# Patient Record
Sex: MALE | Race: WHITE | Employment: PART TIME | URBAN - METROPOLITAN AREA
[De-identification: names, ages, dates, MRNs, and addresses within clinical notes are randomized per-mention and may not be internally consistent; named-entity substitution may affect disease eponyms.]

---

## 2022-05-22 ENCOUNTER — APPOINTMENT (OUTPATIENT)
Dept: GENERAL RADIOLOGY | Age: 67
End: 2022-05-22
Payer: COMMERCIAL

## 2022-05-22 ENCOUNTER — HOSPITAL ENCOUNTER (EMERGENCY)
Age: 67
Discharge: HOME | End: 2022-05-22
Attending: EMERGENCY MEDICINE
Payer: COMMERCIAL

## 2022-05-22 VITALS
HEART RATE: 87 BPM | OXYGEN SATURATION: 93 % | TEMPERATURE: 98.9 F | SYSTOLIC BLOOD PRESSURE: 155 MMHG | DIASTOLIC BLOOD PRESSURE: 97 MMHG | RESPIRATION RATE: 16 BRPM
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
## 2022-05-22 PROCEDURE — 73130 X-RAY EXAM OF HAND: CPT

## 2022-05-22 PROCEDURE — 99283 EMERGENCY DEPT VISIT LOW MDM: CPT

## 2022-05-22 RX ORDER — MELOXICAM 15 MG/1
TABLET ORAL
COMMUNITY

## 2022-05-22 RX ORDER — OLMESARTAN MEDOXOMIL 40 MG/1
40 TABLET ORAL DAILY
COMMUNITY
Start: 2021-12-29

## 2022-05-22 RX ORDER — CELECOXIB 200 MG/1
CAPSULE ORAL
COMMUNITY

## 2022-05-22 ASSESSMENT — PAIN SCALES - GENERAL: PAINLEVEL_OUTOF10: 10

## 2022-05-22 ASSESSMENT — PAIN DESCRIPTION - ORIENTATION: ORIENTATION: LEFT

## 2022-05-22 ASSESSMENT — ENCOUNTER SYMPTOMS
SHORTNESS OF BREATH: 0
SORE THROAT: 0
CONSTIPATION: 0
NAUSEA: 0
VOMITING: 0
DIARRHEA: 0
ABDOMINAL PAIN: 0

## 2022-05-22 ASSESSMENT — PAIN DESCRIPTION - DESCRIPTORS: DESCRIPTORS: ACHING;TIGHTNESS

## 2022-05-22 ASSESSMENT — PAIN DESCRIPTION - LOCATION: LOCATION: WRIST

## 2022-05-22 ASSESSMENT — PAIN - FUNCTIONAL ASSESSMENT: PAIN_FUNCTIONAL_ASSESSMENT: 0-10
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
## 2022-05-22 NOTE — Clinical Note
Mirza Danielson was seen and treated in our emergency department on 5/22/2022. He may return to work on 05/24/2022. If you have any questions or concerns, please don't hesitate to call.       Karey Muller MD

## 2022-05-22 NOTE — ED PROVIDER NOTES
[facility] ED     Emergency Department     Faculty Attestation        I performed a history and physical examination of the patient and discussed management with the resident. I reviewed the residents note and agree with the documented findings and plan of care. Any areas of disagreement are noted on the chart. I was personally present for the key portions of any procedures. I have documented in the chart those procedures where I was not present during the key portions. I have reviewed the emergency nurses triage note. I agree with the chief complaint, past medical history, past surgical history, allergies, medications, social and family history as documented unless otherwise noted below. For Physician Assistant/ Nurse Practitioner cases/documentation I have personally evaluated this patient and have completed at least one if not all key elements of the E/M (history, physical exam, and MDM). Additional findings are as noted. Vital Signs: BP: (!) 155/97  Pulse: 87  Resp: 16  Temp: 98.9 °F (37.2 °C) SpO2: 93 %  PCP:  No primary care provider on file. Pertinent Comments:         Critical Care  None    This patient was evaluated in the Emergency Department for symptoms described in the history of present illness. He/she was evaluated in the context of the global COVID-19 pandemic, which necessitated consideration that the patient might be at risk for infection with the SARS-CoV-2 virus that causes COVID-19. Institutional protocols and algorithms that pertain to the evaluation of patients at risk for COVID-19 are in a state of rapid change based on information released by regulatory bodies including the CDC and federal and state organizations. These policies and algorithms were followed during the patient's care in the ED.     (Please note that portions of this note were completed with a voice recognition program. Efforts were made to edit the dictations but occasionally words are mis-transcribed.  Whenever words are used in this note in any gender, they shall be construed as though they were used in the gender appropriate to the circumstances; and whenever words are used in this note in the singular or plural form, they shall be construed as though they were used in the form appropriate to the circumstances.)    Rahat Kim MD [facility]  Attending Emergency Medicine Physician           Tiana Alvarez MD  05/22/22 5428

## 2023-05-01 ENCOUNTER — APPOINTMENT (OUTPATIENT)
Dept: CT IMAGING | Age: 68
End: 2023-05-01
Payer: MEDICARE

## 2023-05-01 ENCOUNTER — HOSPITAL ENCOUNTER (EMERGENCY)
Age: 68
Discharge: HOME | End: 2023-05-01
Attending: EMERGENCY MEDICINE
Payer: MEDICARE

## 2023-05-01 VITALS
DIASTOLIC BLOOD PRESSURE: 93 MMHG | RESPIRATION RATE: 19 BRPM | BODY MASS INDEX: 32.51 KG/M2 | OXYGEN SATURATION: 92 % | HEART RATE: 94 BPM | HEIGHT: 72 IN | TEMPERATURE: 97.6 F | WEIGHT: 240 LBS | SYSTOLIC BLOOD PRESSURE: 160 MMHG
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
## 2023-05-01 LAB
ANION GAP SERPL CALCULATED.3IONS-SCNC: 9 MMOL/L (ref 9–17)
BUN SERPL-MCNC: 15 MG/DL (ref 8–23)
CALCIUM SERPL-MCNC: 8.7 MG/DL (ref 8.6–10.4)
CHLORIDE SERPL-SCNC: 104 MMOL/L (ref 98–107)
CO2 SERPL-SCNC: 25 MMOL/L (ref 20–31)
CREAT SERPL-MCNC: 0.88 MG/DL (ref 0.7–1.2)
GFR SERPL CREATININE-BSD FRML MDRD: >60 ML/MIN/1.73M2
GLUCOSE SERPL-MCNC: 145 MG/DL (ref 70–99)
POTASSIUM SERPL-SCNC: 3.7 MMOL/L (ref 3.7–5.3)
SARS-COV-2 RDRP RESP QL NAA+PROBE: NOT DETECTED
SODIUM SERPL-SCNC: 138 MMOL/L (ref 135–144)
SPECIMEN DESCRIPTION: NORMAL

## 2023-05-01 PROCEDURE — 80048 BASIC METABOLIC PNL TOTAL CA: CPT

## 2023-05-01 PROCEDURE — 70450 CT HEAD/BRAIN W/O DYE: CPT

## 2023-05-01 PROCEDURE — 87635 SARS-COV-2 COVID-19 AMP PRB: CPT

## 2023-05-01 PROCEDURE — 99285 EMERGENCY DEPT VISIT HI MDM: CPT

## 2023-05-01 PROCEDURE — 71260 CT THORAX DX C+: CPT

## 2023-05-01 PROCEDURE — 6360000004 HC RX CONTRAST MEDICATION: Performed by: HEALTH CARE PROVIDER

## 2023-05-01 PROCEDURE — 72125 CT NECK SPINE W/O DYE: CPT

## 2023-05-01 PROCEDURE — 93005 ELECTROCARDIOGRAM TRACING: CPT | Performed by: EMERGENCY MEDICINE

## 2023-05-01 RX ADMIN — IOPAMIDOL 75 ML: 755 INJECTION, SOLUTION INTRAVENOUS at 16:33

## 2023-05-01 ASSESSMENT — ENCOUNTER SYMPTOMS
NAUSEA: 0
ABDOMINAL PAIN: 0
SORE THROAT: 0
CONSTIPATION: 0
VOMITING: 0
SHORTNESS OF BREATH: 0
DIARRHEA: 0

## 2023-05-01 ASSESSMENT — PAIN DESCRIPTION - PAIN TYPE: TYPE: ACUTE PAIN

## 2023-05-01 ASSESSMENT — PAIN DESCRIPTION - FREQUENCY: FREQUENCY: CONTINUOUS

## 2023-05-01 ASSESSMENT — PAIN SCALES - GENERAL: PAINLEVEL_OUTOF10: 4

## 2023-05-01 ASSESSMENT — PAIN - FUNCTIONAL ASSESSMENT: PAIN_FUNCTIONAL_ASSESSMENT: 0-10

## 2023-05-01 ASSESSMENT — PAIN DESCRIPTION - LOCATION: LOCATION: HEAD

## 2023-05-01 ASSESSMENT — PAIN DESCRIPTION - DESCRIPTORS: DESCRIPTORS: ACHING

## 2023-05-01 NOTE — ED TRIAGE NOTES
Pt to ED c/o fall from 10ft spiral stair case. Pt unsure of loc. Pt hit his head on the right side, hematoma visible. Pt states it was cement that he fell on. Pt was alone when this happened and unsure of how long he was laying there. Pt walked into his home and called family who brought him to the hospital.    Pt is alert and orientedx4. C/o pain in the right side of the head with a visible hematoma, no bleeding. Pt denies neck or back pain. Placed on full cardiac monitor. In gown, calll ight in reach. Vitals stable. Will continue to monitor.

## 2023-05-01 NOTE — ED PROVIDER NOTES
[facility] [address]  ED  Emergency Department Encounter  Emergency Medicine Resident     Pt Name:Som Alexander  MRN: [id number]  [place] 1955  Date of evaluation: 5/1/23  PCP:  No primary care provider on file. Note Started: 2:01 PM EDT      CHIEF COMPLAINT       Chief Complaint   Patient presents with    Fall       HISTORY OF PRESENT ILLNESS  (Location/Symptom, Timing/Onset, Context/Setting, Quality, Duration, Modifying Factors, Severity.)      Joshua Root is a 78 y.o. male who presents with fall. Working on staircase outside, slipped and fell about 10 feet. Fell onto R side on concrete. Unsure LOC. Complains of 4/10 head pain at this time. Patient did land on concrete. Denies any AC use. Wife believes he appears more confused than baseline. At this time patient denies any nausea or chest pain, shortness of breath, cough, congestion, runny nose, abdominal pain, nausea vomiting, lower extremity numbness, weakness, paresthesias, neck pain, dizziness, lightheadedness, vision changes. PAST MEDICAL / SURGICAL / SOCIAL / FAMILY HISTORY      has no past medical history on file. HTN     has a past surgical history that includes laminectomy; hernia repair; Tonsillectomy; and meniscectomy.     Social History     Socioeconomic History    Marital status:      Spouse name: Not on file    Number of children: Not on file    Years of education: Not on file    Highest education level: Not on file   Occupational History    Not on file   Tobacco Use    Smoking status: Never    Smokeless tobacco: Not on file   Substance and Sexual Activity    Alcohol use: Yes     Comment: occasional    Drug use: Not on file    Sexual activity: Not on file   Other Topics Concern    Not on file   Social History Narrative    Not on file     Social Determinants of Health     Financial Resource Strain: Not on file   Food Insecurity: Not on file   Transportation Needs: Not on file   Physical Activity: Not on file

## 2023-05-01 NOTE — ED NOTES
Report given to [facility], all questions answered.       José Antonio Valladares RN  05/01/23 4890

## 2023-05-01 NOTE — ED PROVIDER NOTES
[address] ED     Emergency Department     Faculty Attestation        I performed a history and physical examination of the patient and discussed management with the resident. I reviewed the residents note and agree with the documented findings and plan of care. Any areas of disagreement are noted on the chart. I was personally present for the key portions of any procedures. I have documented in the chart those procedures where I was not present during the key portions. I have reviewed the emergency nurses triage note. I agree with the chief complaint, past medical history, past surgical history, allergies, medications, social and family history as documented unless otherwise noted below. For mid-level providers such as nurse practitioners as well as physicians assistants:    I have personally seen and evaluated the patient. I find the patient's history and physical exam are consistent with NP/PA documentation. I agree with the care provided, treatment rendered, disposition, & follow-up plan. Additional findings are as noted. Vital Signs: BP (!) 151/84   Pulse 72   Temp 97.6 °F (36.4 °C) (Oral)   Resp 15   Ht 6' (1.829 m)   Wt 240 lb (108.9 kg)   SpO2 90%   BMI 32.55 kg/m²   PCP:  No primary care provider on file. Pertinent Comments:     Patient overall was a spiral staircase when the legs gave out and fell over. He states he fell around 8 to 10 feet in his head on the ground and he has a hematoma in the right side of his head but he is awake alert and oriented. He has no other complaints. No anticoagulation use.   Denies any drug or alcohol use      Critical Care  None          Helena River MD    Attending Emergency Medicine Physician            Kasia Sharma MD  05/01/23 7725

## 2023-05-01 NOTE — ED PROVIDER NOTES
FACULTY SIGN-OUT  ADDENDUM     Care of this patient was assumed from previous attending physician. The patient's initial evaluation and plan have been discussed with the prior provider who initially evaluated the patient. Note Started: 3:00 PM EDT    Attestation  I was available and discussed any additional care issues that arose and coordinated the management plans with the resident(s) caring for the patient during my duty period. Any areas of disagreement with resident's documentation of care or procedures are noted on the chart. I was personally present for the key portions of any/all procedures, during my duty period. I have documented in the chart those procedures where I was not present during the key portions. ED COURSE      The patient was given the following medications:  No orders of the defined types were placed in this encounter. RECENT VITALS:   Temp: 97.6 °F (36.4 °C), Heart Rate: 81, Resp: 19, BP: (!) 174/93    MEDICAL DECISION MAKING        Joshua Root is a 78 y.o. male who presents to the Emergency Department with complaints of fall. A&Ox3. Await imaging.       Mac Carreon MD  Attending Emergency Physician    (Please note that portions of this note were completed with a voice recognition program.  Efforts were made to edit the dictations but occasionally words are mis-transcribed.)          Mac Carreon MD  05/01/23 5336

## 2023-05-01 NOTE — PROGRESS NOTES
I signed up for this patient in error. I did not contribute to the patient's care today.     Shanda Pascal MD  Emergency Medicine PGY-3

## 2023-05-01 NOTE — ED NOTES
The following labs were labeled with appropriate pt sticker and tubed to lab:     [x] Blue     [x] Lavender   [] on ice  [x] Green/yellow  [x] Green/black [] on ice  [] Henbaldev Jigar  [] on ice  [x] Yellow  [] Red  [] Type/ Screen  [] ABG  [] VBG    [] COVID-19 swab    [] Rapid  [] PCR  [] Flu swab  [] Peds Viral Panel     [] Urine Sample  [] Fecal Sample  [] Pelvic Cultures  [] Blood Cultures  [] X 2  [] STREP Cultures       Augustin Rebolledo RN  05/01/23 8906

## 2023-05-01 NOTE — ED PROVIDER NOTES
[address] ED  Emergency Department  Emergency Medicine Sign-out     Care of Kerrie Delgado was assumed from Dr. Enma Wild and is being seen for Fall  . The patient's initial evaluation and plan have been discussed with the prior attending who initially evaluated the patient. EMERGENCY DEPARTMENT COURSE / MEDICAL DECISION MAKING:       MEDICATIONS GIVEN:  Orders Placed This Encounter   Medications    iopamidol (ISOVUE-370) 76 % injection 75 mL       LABS / RADIOLOGY:     Labs Reviewed   BASIC METABOLIC PANEL W/ REFLEX TO MG FOR LOW K - Abnormal; Notable for the following components:       Result Value    Glucose 145 (*)     All other components within normal limits   COVID-19, RAPID       No results found. RECENT VITALS:     Temp: 97.6 °F (36.4 °C),  Heart Rate: 94, Resp: 19, BP: (!) 160/93, SpO2: 92 %    This patient is a 78 y.o. Male with fall while working outside, slipped and fell approx 10ft. Khadarene Harada onto right side on concrete. LOC unknown. C/o head pain. No AC use. Wife believes hes more confused than baseline. ED Course as of 05/01/23 1841   Mon May 01, 2023   1521 SARS-CoV-2, Rapid: Not Detected [JS]   1540 Bedside EFAST was negative. [JS]   9386 Delay in obtaining CT scan given need for BMP. [JS]   9146 Patient care transferred to Dr. Alyssa Bustos. [JS]   8874 Called, radiology, the images were actually pushed through by the [place] [address] at [phone]. Patient reports that the reason regimen lidocaine ulcer exam patient turned off oxygen he is maintaining sats of 94 to 95%. He states he is no shortness of breath he is asymptomatic. Will have nurse readjusted (1 and we will continue to monitor [QC]   1821 CT head, CT cervical spine CT chest all negative.   Patient is off oxygen and maintaining sats during the presenting symptomatic [QC]   1827 Patient was ambulated with pulse oximeter readings of 95 to 97% he is not dyspneic denying chest pain or shortness of breath. [QC]   1840 Return

## 2023-05-01 NOTE — DISCHARGE INSTRUCTIONS
You are seen in the emergency department after a fall. We obtained CT imaging of your head neck and chest which showed no abnormalities require treatment here in emergency department. We found no issue on imaging, however he may still have symptoms following this event such as signs or symptoms of concussion. Treatment of a head injury/ concussion involves:  ? Preventing further injury - While you are healing, it's important not to do too much, especially activities that could lead to another head injury, like organized sports. Having a second injury while the brain is healing from a concussion can seriously damage the brain. ? Physical rest - Rest your body, and get plenty of sleep. Avoid heavy exercise or too much physical activity if it makes you feel worse. ? Mental rest - Doctors also call this \"cognitive rest.\" Avoid doing activities that need concentration or a lot of attention if they make you feel worse. Sometimes, activities using screens, especially videogames, can make people feel worse after a concussion. You can start doing these things again as you get better. ? Avoiding alcohol and cannabis while you are still having symptoms of concussion  ? Treating headache - You can take an over-the-counter pain reliever if you have a headache. These include acetaminophen (sample brand name: Tylenol) and NSAIDs such as ibuprofen (sample brand names: Advil, Motrin) and naproxen (sample brand name: Aleve). Most concussions get better on their own, but it can take time. For some people, the symptoms go away within minutes to hours. Other people have symptoms for weeks to months.      Return to the ER if you cannot be fully woken up, are acting confused or disoriented, have a sudden and persistent change in your behavior, cannot walk normally, have trouble speaking or slurred speech, have severe weakness or cannot move an arm, leg, or 1 side of your face, have a seizure, or jerking of your arms or legs you

## 2023-05-01 NOTE — ED NOTES
Pt ambulated with continuous pulse ox. Pt oxygen saturation on room air during ambulation 95-96%. Dr. Dipika Shelley notified.       Lizzy Goddard RN  05/01/23 0392

## 2023-05-01 NOTE — ED NOTES
Pt reports no needs at this time, resting on stretcher, call light in reach, RR even and non labored.       Denia Joseph RN  05/01/23 1035

## 2023-05-02 LAB
EKG ATRIAL RATE: 74 BPM
EKG P AXIS: 40 DEGREES
EKG P-R INTERVAL: 154 MS
EKG Q-T INTERVAL: 384 MS
EKG QRS DURATION: 94 MS
EKG QTC CALCULATION (BAZETT): 426 MS
EKG R AXIS: -39 DEGREES
EKG T AXIS: 16 DEGREES
EKG VENTRICULAR RATE: 74 BPM